# Patient Record
(demographics unavailable — no encounter records)

---

## 2025-05-05 NOTE — PLAN
[FreeTextEntry1] : STI testing PAP/HPV done as unsure of prior result prior to 2023 and not in our system Discussed contraceptive options - both hormonal and on and patient given all informational material including Annovera RTO PRN or to initiate contraception To f/u with GI regarding possible UC flare

## 2025-05-05 NOTE — PHYSICAL EXAM
[FreeTextEntry2] :  Romy Reinoso MA [FreeTextEntry3] :  no thyromegaly [FreeTextEntry6] :  no lymphadenopathy [FreeTextEntry7] : no hernias [Examination Of The Breasts] : a normal appearance [Normal] : normal [No Discharge] : no discharge [No Masses] : no breast masses were palpable [Tenderness] : nontender [Enlarged ___ wks] : not enlarged [Mass ___ cm] : no uterine mass was palpated [Uterine Adnexae] : non-palpable [FreeTextEntry5] : on palpation

## 2025-07-02 NOTE — ASSESSMENT
[FreeTextEntry1] : Continue mesalamine Start Budesonide 9 mg daily  Start hyoscyamine 0.375mg 2x/ day Start Proctozone hemorrhoidal cream 2x/ day for 2 weeks   I spent 34 minutes with the patient and answered all of her questions., ordered her medications and completed her office note.

## 2025-07-02 NOTE — HISTORY OF PRESENT ILLNESS
[FreeTextEntry1] : She complains of flareups of ulcerative colitis with diarrhea and rectal bleeding with intermittent episodes of lower abdominal pain associate with abdominal bloating.  Last colonoscopy was October of last year by another physician who found she had inflammation in the rectum and sigmoid colon.  She was treated with mesalamine 4 tablets a day.  She has been on that dose since last October but it is not really helping her. She admits to increased stress in her life.

## 2025-07-02 NOTE — REVIEW OF SYSTEMS
[Abdominal Pain] : abdominal pain [Constipation] : constipation [Diarrhea] : diarrhea [Bleeding] : bleeding [Bloating (gassiness)] : bloating [Negative] : Heme/Lymph

## 2025-07-22 NOTE — ASSESSMENT
[FreeTextEntry1] : Continue mesalamine and budesonide for now Stop hyoscyamine  Start Metamucil 1 tablespoon in 8 ounces every evening  Follow-up in 1 month   I spent 24 minutes with the patient and answered all of her questions and completed her office chart

## 2025-07-22 NOTE — HISTORY OF PRESENT ILLNESS
[FreeTextEntry1] : She has been on mesalamine with no improvement so I added budesonide 9 mg daily on her last visit.  I also started her on hyoscyamine 0.375 mg twice a day.  She denies abdominal bloating. She has a very dry mouth and is drinking a tremendous bit of water.  She does not have any abdominal pain she admits to intermittent episodes of diarrhea.  She denies rectal bleeding